# Patient Record
Sex: FEMALE | Race: BLACK OR AFRICAN AMERICAN | NOT HISPANIC OR LATINO | URBAN - METROPOLITAN AREA
[De-identification: names, ages, dates, MRNs, and addresses within clinical notes are randomized per-mention and may not be internally consistent; named-entity substitution may affect disease eponyms.]

---

## 2023-11-06 ENCOUNTER — APPOINTMENT (RX ONLY)
Dept: URBAN - METROPOLITAN AREA CLINIC 99 | Facility: CLINIC | Age: 33
Setting detail: DERMATOLOGY
End: 2023-11-06

## 2023-11-06 DIAGNOSIS — L91.0 HYPERTROPHIC SCAR: ICD-10-CM | Status: STABLE

## 2023-11-06 DIAGNOSIS — Z71.89 OTHER SPECIFIED COUNSELING: ICD-10-CM

## 2023-11-06 PROCEDURE — ? SUNSCREEN RECOMMENDATIONS

## 2023-11-06 PROCEDURE — ? ADDITIONAL NOTES

## 2023-11-06 PROCEDURE — ? TREATMENT REGIMEN

## 2023-11-06 PROCEDURE — 99203 OFFICE O/P NEW LOW 30 MIN: CPT

## 2023-11-06 PROCEDURE — ? COUNSELING

## 2023-11-06 PROCEDURE — ? PRESCRIPTION

## 2023-11-06 RX ORDER — HALOBETASOL PROPIONATE 0.5 MG/G
CREAM TOPICAL
Qty: 15 | Refills: 1 | Status: ERX | COMMUNITY
Start: 2023-11-06

## 2023-11-06 RX ADMIN — HALOBETASOL PROPIONATE SMALL AMOUNT: 0.5 CREAM TOPICAL at 00:00

## 2023-11-06 ASSESSMENT — LOCATION DETAILED DESCRIPTION DERM
LOCATION DETAILED: RIGHT LATERAL ABDOMEN
LOCATION DETAILED: PERIUMBILICAL SKIN
LOCATION DETAILED: RIGHT RIB CAGE

## 2023-11-06 ASSESSMENT — LOCATION ZONE DERM: LOCATION ZONE: TRUNK

## 2023-11-06 ASSESSMENT — LOCATION SIMPLE DESCRIPTION DERM: LOCATION SIMPLE: ABDOMEN

## 2023-11-06 NOTE — PROCEDURE: MIPS QUALITY
Quality 130: Documentation Of Current Medications In The Medical Record: Current Medications Documented
Quality 47: Advance Care Plan: Advance Care Planning discussed and documented in the medical record; patient did not wish or was not able to name a surrogate decision maker or provide an advance care plan.
Quality 431: Preventive Care And Screening: Unhealthy Alcohol Use - Screening: Patient not identified as an unhealthy alcohol user when screened for unhealthy alcohol use using a systematic screening method
Quality 110: Preventive Care And Screening: Influenza Immunization: Influenza Immunization Administered during Influenza season
Detail Level: Detailed
Quality 137: Melanoma: Continuity Of Care - Recall System: Patient information entered into a recall system that includes: target date for the next exam specified AND a process to follow up with patients regarding missed or unscheduled appointments
Quality 226: Preventive Care And Screening: Tobacco Use: Screening And Cessation Intervention: Patient screened for tobacco use and is an ex/non-smoker

## 2023-11-06 NOTE — HPI: SCAR (KELOIDS)
How Severe Are They?: moderate
Is This A New Presentation, Or A Follow-Up?: Scars
Additional History: She states she has tried injections in the past, with no improvement.

## 2023-11-06 NOTE — PROCEDURE: TREATMENT REGIMEN
Initiate Treatment: halobetasol propionate 0.05 % topical cream: Apply small amount topically to scars on abdomen twice daily for 2 weeks, then once every other day.
Detail Level: Zone

## 2023-11-06 NOTE — PROCEDURE: ADDITIONAL NOTES
Render Risk Assessment In Note?: no
Detail Level: Simple
Additional Notes: Discussed with patient that surgical removal would likely lead to recurrence. Offered Kenalog injection, but patient declines due to history of past injections which were unsuccessful. Patient states the areas are itchy. Patient would like to try topicals to control the itch.

## 2023-12-18 ENCOUNTER — HOSPITAL ENCOUNTER (EMERGENCY)
Dept: HOSPITAL 92 - ERS | Age: 33
Discharge: HOME | End: 2023-12-18
Payer: COMMERCIAL

## 2023-12-18 DIAGNOSIS — R51.9: Primary | ICD-10-CM

## 2023-12-18 DIAGNOSIS — R11.10: ICD-10-CM

## 2023-12-18 PROCEDURE — 70450 CT HEAD/BRAIN W/O DYE: CPT

## 2023-12-18 PROCEDURE — 96375 TX/PRO/DX INJ NEW DRUG ADDON: CPT

## 2023-12-18 PROCEDURE — 96365 THER/PROPH/DIAG IV INF INIT: CPT

## 2023-12-22 ENCOUNTER — HOSPITAL ENCOUNTER (EMERGENCY)
Dept: HOSPITAL 92 - ERS | Age: 33
Discharge: HOME | End: 2023-12-22
Payer: COMMERCIAL

## 2023-12-22 DIAGNOSIS — J02.9: Primary | ICD-10-CM

## 2023-12-22 PROCEDURE — 96372 THER/PROPH/DIAG INJ SC/IM: CPT

## 2023-12-22 PROCEDURE — 87430 STREP A AG IA: CPT

## 2023-12-22 PROCEDURE — 99283 EMERGENCY DEPT VISIT LOW MDM: CPT

## 2023-12-22 PROCEDURE — 87081 CULTURE SCREEN ONLY: CPT

## 2024-03-13 ENCOUNTER — APPOINTMENT (RX ONLY)
Dept: URBAN - METROPOLITAN AREA CLINIC 99 | Facility: CLINIC | Age: 34
Setting detail: DERMATOLOGY
End: 2024-03-13

## 2024-03-13 DIAGNOSIS — B35.4 TINEA CORPORIS: ICD-10-CM | Status: INADEQUATELY CONTROLLED

## 2024-03-13 DIAGNOSIS — L91.0 HYPERTROPHIC SCAR: ICD-10-CM | Status: WELL CONTROLLED

## 2024-03-13 DIAGNOSIS — Z71.89 OTHER SPECIFIED COUNSELING: ICD-10-CM

## 2024-03-13 PROCEDURE — ? COUNSELING

## 2024-03-13 PROCEDURE — ? TREATMENT REGIMEN

## 2024-03-13 PROCEDURE — ? PRESCRIPTION

## 2024-03-13 PROCEDURE — ? SUNSCREEN RECOMMENDATIONS

## 2024-03-13 PROCEDURE — 99213 OFFICE O/P EST LOW 20 MIN: CPT

## 2024-03-13 PROCEDURE — ? ADDITIONAL NOTES

## 2024-03-13 RX ORDER — KETOCONAZOLE 20 MG/G
CREAM TOPICAL
Qty: 30 | Refills: 0 | Status: ERX | COMMUNITY
Start: 2024-03-13

## 2024-03-13 RX ADMIN — KETOCONAZOLE SMALL AMMT: 20 CREAM TOPICAL at 00:00

## 2024-03-13 ASSESSMENT — LOCATION DETAILED DESCRIPTION DERM
LOCATION DETAILED: LEFT AXILLARY VAULT
LOCATION DETAILED: RIGHT LATERAL ABDOMEN
LOCATION DETAILED: RIGHT RIB CAGE
LOCATION DETAILED: PERIUMBILICAL SKIN

## 2024-03-13 ASSESSMENT — LOCATION SIMPLE DESCRIPTION DERM
LOCATION SIMPLE: LEFT AXILLARY VAULT
LOCATION SIMPLE: ABDOMEN

## 2024-03-13 ASSESSMENT — LOCATION ZONE DERM
LOCATION ZONE: TRUNK
LOCATION ZONE: AXILLAE

## 2024-03-13 NOTE — PROCEDURE: ADDITIONAL NOTES
Render Risk Assessment In Note?: no
Detail Level: Simple
Additional Notes: Pt states halobetosol helped with itching and keloids are not causing her any problems as of today. Recommend that she stop halobetosol and switch to OTC Hydrocortisone she itching does occur.
Additional Notes: Pt states that she did receive a steroid shot in her left arm for a recent illness and is not sure if that is what caused the rash. Recommend her try Rx topical and if not clear in a month we can do a biopsy.

## 2024-03-13 NOTE — PROCEDURE: TREATMENT REGIMEN
Otc Regimen: Hydrocortisone
Discontinue Regimen: halobetasol propionate 0.05 % topical cream: Apply small amount topically to scars on abdomen twice daily for 2 weeks, then once every other day.
Detail Level: Zone
Initiate Treatment: ketoconazole 2 % topical cream : Apply small amount topically to affected areas on armpits once every other day

## 2024-04-12 ENCOUNTER — APPOINTMENT (RX ONLY)
Dept: URBAN - METROPOLITAN AREA CLINIC 99 | Facility: CLINIC | Age: 34
Setting detail: DERMATOLOGY
End: 2024-04-12

## 2024-04-12 DIAGNOSIS — Z71.89 OTHER SPECIFIED COUNSELING: ICD-10-CM

## 2024-04-12 DIAGNOSIS — L30.9 DERMATITIS, UNSPECIFIED: ICD-10-CM | Status: INADEQUATELY CONTROLLED

## 2024-04-12 PROCEDURE — ? PRESCRIPTION

## 2024-04-12 PROCEDURE — ? TREATMENT REGIMEN

## 2024-04-12 PROCEDURE — ? COUNSELING

## 2024-04-12 PROCEDURE — 11102 TANGNTL BX SKIN SINGLE LES: CPT

## 2024-04-12 PROCEDURE — 99212 OFFICE O/P EST SF 10 MIN: CPT | Mod: 25

## 2024-04-12 PROCEDURE — ? ADDITIONAL NOTES

## 2024-04-12 PROCEDURE — ? SUNSCREEN RECOMMENDATIONS

## 2024-04-12 PROCEDURE — ? BIOPSY BY SHAVE METHOD

## 2024-04-12 RX ORDER — DESONIDE 0.5 MG/G
CREAM TOPICAL
Qty: 60 | Refills: 0 | Status: ERX | COMMUNITY
Start: 2024-04-12

## 2024-04-12 RX ADMIN — DESONIDE SMALL AMMT: 0.5 CREAM TOPICAL at 00:00

## 2024-04-12 ASSESSMENT — LOCATION ZONE DERM: LOCATION ZONE: AXILLAE

## 2024-04-12 ASSESSMENT — LOCATION SIMPLE DESCRIPTION DERM: LOCATION SIMPLE: LEFT AXILLARY VAULT

## 2024-04-12 ASSESSMENT — LOCATION DETAILED DESCRIPTION DERM: LOCATION DETAILED: LEFT AXILLARY VAULT

## 2024-04-12 NOTE — PROCEDURE: TREATMENT REGIMEN
Discontinue Regimen: -ketoconazole 2 % topical cream : Apply small amount topically to affected areas on armpits once every other day
Detail Level: Zone
Initiate Treatment: -desonide 0.05 % topical cream : Apply small amount to the affected area twice a day for 2 weeks than once every other day. Avoid applying to biopsy site.

## 2024-04-12 NOTE — PROCEDURE: ADDITIONAL NOTES
Additional Notes: Pt states that Ketoconazole did not clear up the rash. We will do a biopsy today and prescribe a different topical that she can use while we wait on the biopsy results.
Detail Level: Simple
Render Risk Assessment In Note?: no

## 2024-04-30 ENCOUNTER — APPOINTMENT (RX ONLY)
Dept: URBAN - METROPOLITAN AREA CLINIC 99 | Facility: CLINIC | Age: 34
Setting detail: DERMATOLOGY
End: 2024-04-30

## 2024-04-30 DIAGNOSIS — L259 CONTACT DERMATITIS AND OTHER ECZEMA, UNSPECIFIED CAUSE: ICD-10-CM | Status: WELL CONTROLLED

## 2024-04-30 DIAGNOSIS — Z71.89 OTHER SPECIFIED COUNSELING: ICD-10-CM

## 2024-04-30 PROBLEM — L23.9 ALLERGIC CONTACT DERMATITIS, UNSPECIFIED CAUSE: Status: ACTIVE | Noted: 2024-04-30

## 2024-04-30 PROCEDURE — 99213 OFFICE O/P EST LOW 20 MIN: CPT

## 2024-04-30 PROCEDURE — ? SUNSCREEN RECOMMENDATIONS

## 2024-04-30 PROCEDURE — ? COUNSELING

## 2024-04-30 PROCEDURE — ? TREATMENT REGIMEN

## 2024-04-30 ASSESSMENT — LOCATION ZONE DERM: LOCATION ZONE: AXILLAE

## 2024-04-30 ASSESSMENT — LOCATION SIMPLE DESCRIPTION DERM: LOCATION SIMPLE: LEFT AXILLARY VAULT

## 2024-04-30 ASSESSMENT — BSA RASH: BSA RASH: 0

## 2024-04-30 ASSESSMENT — SEVERITY ASSESSMENT: SEVERITY: CLEAR

## 2024-04-30 ASSESSMENT — LOCATION DETAILED DESCRIPTION DERM: LOCATION DETAILED: LEFT AXILLARY VAULT

## 2024-04-30 NOTE — PROCEDURE: TREATMENT REGIMEN
Otc Regimen: Deodorant for sensitive skin, dove or almay
Discontinue Regimen: Desonide Qod - can start using again Qod if flaring up, or return to clinic
Detail Level: Zone
Plan: Store razor outside of shower, use new razor when shaving \\n\\nDiscussed with patient to take cooler showers, use dove sensitive for cleanser, use Cerave or Cetaphil brands for moisturizer, and finally use all free and clear detergent.